# Patient Record
Sex: FEMALE | Race: ASIAN
[De-identification: names, ages, dates, MRNs, and addresses within clinical notes are randomized per-mention and may not be internally consistent; named-entity substitution may affect disease eponyms.]

---

## 2020-04-02 ENCOUNTER — HOSPITAL ENCOUNTER (EMERGENCY)
Dept: HOSPITAL 76 - ED | Age: 50
Discharge: HOME | End: 2020-04-02
Payer: SELF-PAY

## 2020-04-02 VITALS — SYSTOLIC BLOOD PRESSURE: 159 MMHG | DIASTOLIC BLOOD PRESSURE: 103 MMHG

## 2020-04-02 DIAGNOSIS — J45.21: ICD-10-CM

## 2020-04-02 DIAGNOSIS — I10: ICD-10-CM

## 2020-04-02 DIAGNOSIS — Z20.828: ICD-10-CM

## 2020-04-02 DIAGNOSIS — H66.003: Primary | ICD-10-CM

## 2020-04-02 PROCEDURE — 87635 SARS-COV-2 COVID-19 AMP PRB: CPT

## 2020-04-02 PROCEDURE — 71045 X-RAY EXAM CHEST 1 VIEW: CPT

## 2020-04-02 PROCEDURE — 99284 EMERGENCY DEPT VISIT MOD MDM: CPT

## 2020-04-02 PROCEDURE — 81599 UNLISTED MAAA: CPT

## 2020-04-02 NOTE — XRAY REPORT
Reason:  soa

Procedure Date:  04/02/2020   

Accession Number:  [id number] / [id number]                    

Procedure:  XR  - Chest 1 View X-Ray CPT Code:  28371

 

***Final Report***

 

 

FULL RESULT:

 

 

EXAM:

CHEST RADIOGRAPHY

 

EXAM DATE: 4/2/2020 01:22 AM.

 

CLINICAL HISTORY: Shortness of breath.

 

COMPARISON: None.

 

TECHNIQUE: 1 view.

 

FINDINGS:

Lungs/Pleura: No alveolar consolidation or pleural effusion seen. No 

pneumothorax.

 

Mediastinum: Within exam limitations, the cardiomediastinal contour is 

normal.

 

Other: None.

IMPRESSION:

1. No acute abnormality seen in the chest.

 

RADIA

## 2020-04-02 NOTE — ED PHYSICIAN DOCUMENTATION
PD HPI URI





- Stated complaint


Stated Complaint: SOA/COUGH





- Chief complaint


Chief Complaint: Resp





- History obtained from


History obtained from: Patient





- History of Present Illness


Timing - onset: How many days ago (3)


Timing duration: Days (3)


Timing details: Gradual onset, Still present


Associated symptoms: Nasal congestion, Dry cough, Dyspnea.  No: Fever, Chills, 

Sweats


Contributing factors: No: Sick contact


Improves by: Rest, MDI/nebulizer


Worsened by: Activity


Similar symptoms before: Diagnosis (asthma as a child)


Recently seen: Other (contacted PMD and has an inhaler prescribed.)





- Additional information


Additional information: 





Previously well 50-year-old female with a history of asthma has developed a 

cough and congestion and some shortness of breath.  She was able to call her 

doctor and get an inhaler which she has not used for years.  She thought that 

this helped a little bit.  She today has persistence of shortness of breath and 

cough which is nonproductive and she is come to the emergency department.  She 

is worried about coronavirus. 





She is also been having some back pain is been using some salon pause she does 

feel that she is hydrating adequately.





Review of Systems


Constitutional: reports: Fatigue.  denies: Fever, Chills, Myalgias


Eyes: denies: Decreased vision


Ears: denies: Ear pain


Nose: reports: Rhinorrhea / runny nose, Congestion


Throat: denies: Sore throat


Cardiac: denies: Chest pain / pressure, Palpitations, Pedal edema, Calf pain


Respiratory: reports: Dyspnea, Cough


GI: denies: Abdominal Pain, Nausea, Vomiting


: denies: Dysuria, Frequency


Skin: denies: Rash


Musculoskeletal: reports: Back pain.  denies: Neck pain, Extremity pain


Neurologic: denies: Generalized weakness, Focal weakness, Numbness





PD PAST MEDICAL HISTORY





- Past Medical History


Past Medical History: Yes


Cardiovascular: Hypertension


Respiratory: Asthma


Psych: Anxiety





- Past Surgical History


Past Surgical History: No





- Present Medications


Home Medications: 


                                Ambulatory Orders











 Medication  Instructions  Recorded  Confirmed


 


Azithromycin [Zithromax] 250 mg PO DAILY #4 tablet 04/02/20 


 


Lisinopril [Zestril] 40 mg PO DAILY 04/02/20 04/02/20


 


Meloxicam 15 mg PO DAILY 04/02/20 04/02/20


 


hydrOXYzine HCL [Hydroxyzine HCl] 25 mg PO QPM PRN 04/02/20 04/02/20














- Allergies


Allergies/Adverse Reactions: 


                                    Allergies











Allergy/AdvReac Type Severity Reaction Status Date / Time


 


No Known Drug Allergies Allergy   Verified 04/02/20 00:50














- Social History


Does the pt smoke?: No


Smoking Status: Never smoker


Does the pt drink ETOH?: No


Does the pt have substance abuse?: No





- Immunizations


Immunizations are current?: Yes





- POLST


Patient has POLST: No





PD ED PE NORMAL





- Vitals


Vital signs reviewed: Yes (hypertensive)





- General


General: Alert and oriented X 3, No acute distress, Well developed/nourished





- HEENT


HEENT: Atraumatic, PERRL, EOMI, Pharynx benign, Other (left TM is inflamed with 

indistinct landmarks and the right is less invovled. )





- Neck


Neck: Supple, no meningeal sign, No bony TTP





- Cardiac


Cardiac: RRR, No murmur





- Respiratory


Respiratory: No respiratory distress, Other (diminished breath sounds)





- Abdomen


Abdomen: Soft, Non tender





- Back


Back: No CVA TTP, No spinal TTP, Other (There are solan pas on the back. )





- Derm


Derm: Normal color, Warm and dry, No rash





- Extremities


Extremities: No deformity, No edema





- Neuro


Neuro: Alert and oriented X 3, CNs 2-12 intact, No motor deficit, No sensory 

deficit, Normal speech


Eye Opening: Spontaneous


Motor: Obeys Commands


Verbal: Oriented


GCS Score: 15





- Psych


Psych: Normal mood, Normal affect





Results





- Vitals


Vitals: 


                               Vital Signs - 24 hr











  04/02/20





  00:35


 


Temperature 36.6 C


 


Heart Rate 87


 


Respiratory 16





Rate 


 


Blood Pressure 169/98 H


 


O2 Saturation 97








                                     Oxygen











O2 Source                      Room air

















- Rads (name of study)


  ** cxr


Radiology: Prelim report reviewed (Impression: 1.  No acute abnormality seen in 

the chest.), EMP read indepedently, See rad report





PD MEDICAL DECISION MAKING





- ED course


Complexity details: reviewed results, re-evaluated patient, considered 

differential, d/w patient


ED course: 





50-year-old female with cough and congestion during the peak coronavirus has no 

fever is not hypoxic she does have otitis on exam and she has a history of 

asthma.  She is swab for coronavirus the chest x-ray is obtained she is 

administered dexamethasone 10 mg orally and a azithromycin 500 mg orally. 

Respiratory therapy comes to the emergency department and assist with teaching 

the patient in proper use of the rescue inhaler.  This does help.





Departure





- Departure


Disposition: 01 Home, Self Care


Clinical Impression: 


Otitis media


Qualifiers:


 Otitis media type: suppurative Chronicity: acute Laterality: bilateral 

Recurrence: non-recurrent Spontaneous tympanic membrane rupture: without 

spontaneous rupture Qualified Code(s): H66.003 - Acute suppurative otitis media 

without spontaneous rupture of ear drum, bilateral





Reactive airway disease


Qualifiers:


 Asthma severity: mild Asthma persistence: intermittent Asthma complication 

type: with acute exacerbation Qualified Code(s): J45.21 - Mild intermittent 

asthma with (acute) exacerbation





Condition: Stable


Instructions:  ED Bronchitis Asthmatic, ED Otitis Media Acute Adult, COVID-19 

[facility], COVID-19 [facility] 

Department Statement


Follow-Up: 


Adri Oconnor ARNP [Physician No Access] - 


Prescriptions: 


Azithromycin [Zithromax] 250 mg PO DAILY #4 tablet

## 2021-08-22 ENCOUNTER — HOSPITAL ENCOUNTER (OUTPATIENT)
Dept: HOSPITAL 76 - DI.N | Age: 51
Discharge: HOME | End: 2021-08-22
Attending: PHYSICIAN ASSISTANT
Payer: SELF-PAY

## 2021-08-22 DIAGNOSIS — R10.12: Primary | ICD-10-CM

## 2021-08-22 NOTE — XRAY REPORT
PROCEDURE:  Abdomen 2 View X-Ray

 

INDICATIONS:  LUQ ABD PAIN

 

TECHNIQUE:  1 view of the abdomen were acquired.  

 

COMPARISON:  Correlation is made with the accompanying abdominal plain films, 8/22/2021

 

FINDINGS:  

Surgical changes and devices:  None.  

 

Bowel:  No pneumoperitoneum.  The bowel gas pattern is normal.  

 

Soft tissues:  No masses; visualized solid organ contours appear normal in size.  No suspicious abdom
inal calcifications.  

 

Bones:  No suspicious bony abnormalities.  Focal lower lumbar spine degenerative changes are seen.

 

 

 

IMPRESSION:  

 

There is a nonobstructive bowel gas pattern seen.

 

If the symptoms persist or worsen, please consider a dedicated follow-up CT for further evaluation.

 

Reviewed by: Andrew Plata MD on 8/22/2021 2:20 PM MIRIAN

Approved by: Andrew Plata MD on 8/22/2021 2:20 PM MIRIAN

 

 

Station ID:  [id number]

## 2021-08-22 NOTE — XRAY REPORT
PROCEDURE:  Chest 2 View X-Ray

 

INDICATIONS:  ABD PAIN LUQ

 

TECHNIQUE:  2 view(s) of the chest.  

 

COMPARISON:  Prior chest radiograph, 4-20 Correlation is made with the accompanying abdominal plain f
ilms, 8/22/2021.

 

FINDINGS:  

 

Surgical changes and devices:  None.  

 

Lungs and pleura:  No pleural effusions or pneumothorax.  Lungs are clear.  

 

Mediastinum:  Mediastinal contours are normal.  Heart size is normal.  

 

Bones and chest wall:  No suspicious bony abnormalities.  Soft tissues appear unremarkable.  

 

 

 

IMPRESSION:  

 

Unremarkable chest plain films, stable from prior.

 

Reviewed by: Andrew Plata MD on 8/22/2021 2:20 PM AKSANTY

Approved by: Andrew Plata MD on 8/22/2021 2:20 PM AKDT

 

 

Station ID:  [id number]

## 2021-08-23 ENCOUNTER — HOSPITAL ENCOUNTER (OUTPATIENT)
Dept: HOSPITAL 76 - LAB.N | Age: 51
Discharge: HOME | End: 2021-08-23
Attending: PHYSICIAN ASSISTANT
Payer: SELF-PAY

## 2021-08-23 DIAGNOSIS — K29.70: Primary | ICD-10-CM

## 2021-08-23 LAB
ALBUMIN DIAFP-MCNC: 4.5 G/DL (ref 3.2–5.5)
ALBUMIN/GLOB SERPL: 1.3 {RATIO} (ref 1–2.2)
ALP SERPL-CCNC: 51 IU/L (ref 42–121)
ALT SERPL W P-5'-P-CCNC: 18 IU/L (ref 10–60)
ANION GAP SERPL CALCULATED.4IONS-SCNC: 10 MMOL/L (ref 6–13)
AST SERPL W P-5'-P-CCNC: 25 IU/L (ref 10–42)
BASOPHILS NFR BLD AUTO: 0.1 10^3/UL (ref 0–0.1)
BASOPHILS NFR BLD AUTO: 1 %
BILIRUB BLD-MCNC: 0.7 MG/DL (ref 0.2–1)
BUN SERPL-MCNC: 19 MG/DL (ref 6–20)
CALCIUM UR-MCNC: 9.9 MG/DL (ref 8.5–10.3)
CHLORIDE SERPL-SCNC: 103 MMOL/L (ref 101–111)
CO2 SERPL-SCNC: 27 MMOL/L (ref 21–32)
CREAT SERPLBLD-SCNC: 0.9 MG/DL (ref 0.4–1)
EOSINOPHIL # BLD AUTO: 0.1 10^3/UL (ref 0–0.7)
EOSINOPHIL NFR BLD AUTO: 1.7 %
ERYTHROCYTE [DISTWIDTH] IN BLOOD BY AUTOMATED COUNT: 12.6 % (ref 12–15)
GFRSERPLBLD MDRD-ARVRAT: 66 ML/MIN/{1.73_M2} (ref 89–?)
GLOBULIN SER-MCNC: 3.6 G/DL (ref 2.1–4.2)
GLUCOSE SERPL-MCNC: 137 MG/DL (ref 70–100)
HCT VFR BLD AUTO: 36.8 % (ref 37–47)
HGB UR QL STRIP: 12.4 G/DL (ref 12–16)
LIPASE SERPL-CCNC: 50 U/L (ref 22–51)
LYMPHOCYTES # SPEC AUTO: 2 10^3/UL (ref 1.5–3.5)
LYMPHOCYTES NFR BLD AUTO: 40.6 %
MCH RBC QN AUTO: 30.5 PG (ref 27–31)
MCHC RBC AUTO-ENTMCNC: 33.7 G/DL (ref 32–36)
MCV RBC AUTO: 90.4 FL (ref 81–99)
MONOCYTES # BLD AUTO: 0.5 10^3/UL (ref 0–1)
MONOCYTES NFR BLD AUTO: 10.2 %
NEUTROPHILS # BLD AUTO: 2.2 10^3/UL (ref 1.5–6.6)
NEUTROPHILS # SNV AUTO: 4.8 X10^3/UL (ref 4.8–10.8)
NEUTROPHILS NFR BLD AUTO: 46.3 %
NRBC # BLD AUTO: 0 /100WBC
NRBC # BLD AUTO: 0 X10^3/UL
PDW BLD AUTO: 9.7 FL (ref 7.9–10.8)
PLATELET # BLD: 294 10^3/UL (ref 130–450)
POTASSIUM SERPL-SCNC: 3.1 MMOL/L (ref 3.5–5)
PROT SPEC-MCNC: 8.1 G/DL (ref 6.7–8.2)
RBC MAR: 4.07 10^6/UL (ref 4.2–5.4)
SODIUM SERPLBLD-SCNC: 140 MMOL/L (ref 135–145)

## 2021-08-23 PROCEDURE — 87338 HPYLORI STOOL AG IA: CPT

## 2021-08-23 PROCEDURE — 85025 COMPLETE CBC W/AUTO DIFF WBC: CPT

## 2021-08-23 PROCEDURE — 83690 ASSAY OF LIPASE: CPT

## 2021-08-23 PROCEDURE — 36415 COLL VENOUS BLD VENIPUNCTURE: CPT

## 2021-08-23 PROCEDURE — 80053 COMPREHEN METABOLIC PANEL: CPT
